# Patient Record
Sex: FEMALE | Race: WHITE | Employment: UNEMPLOYED | URBAN - METROPOLITAN AREA
[De-identification: names, ages, dates, MRNs, and addresses within clinical notes are randomized per-mention and may not be internally consistent; named-entity substitution may affect disease eponyms.]

---

## 2020-06-30 ENCOUNTER — TELEPHONE (OUTPATIENT)
Dept: ORTHOPEDIC SURGERY | Age: 40
End: 2020-06-30

## 2020-07-02 ENCOUNTER — OFFICE VISIT (OUTPATIENT)
Dept: ORTHOPEDIC SURGERY | Age: 40
End: 2020-07-02
Payer: COMMERCIAL

## 2020-07-02 VITALS — BODY MASS INDEX: 25.33 KG/M2 | WEIGHT: 187 LBS | HEIGHT: 72 IN

## 2020-07-02 PROBLEM — S43.431A TYPE 2 SUPERIOR LABRUM EXTENDING FROM ANTERIOR TO POSTERIOR (SLAP) LESION OF RIGHT SHOULDER: Status: ACTIVE | Noted: 2020-07-02

## 2020-07-02 PROBLEM — M75.111 NONTRAUMATIC INCOMPLETE TEAR OF RIGHT ROTATOR CUFF: Status: ACTIVE | Noted: 2020-07-02

## 2020-07-02 PROCEDURE — 99203 OFFICE O/P NEW LOW 30 MIN: CPT | Performed by: ORTHOPAEDIC SURGERY

## 2020-07-02 NOTE — PROGRESS NOTES
I am evaluating this patient as a consult at the request of Elisa Faustin MD; Dr. Shon Morocho DC        Chief Complaint:  Shoulder Pain (Right shoulder pain started a year ago)      History of Present Illness:  Shaan Esparza is a  37 y.o. right hand dominant female here regarding 1 year of right shoulder pain. Patient was initially evaluated by her PCP who thought we she was developing a frozen shoulder, patient is an insulin-dependent type 1 diabetic, she does have an insulin pump. Patient did not see improvement and returned back to her PCP who provided NSAIDs for bursitis, she did not see improvement again. She then presented to Dr. Asa Aviles chiropractor for acupuncture, he has been performing acupuncture, using the TENS unit, doing ultrasound through physical therapy exercises with her but again she was not showing significant improvement. She ultimately obtained an MRI and is here today to discuss those results. She complains of 3 out of 10 anterior pain, certain activities do not cause her any discomfort such as activities of daily living and fixing her hair. 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         Pain Assessment:  Pain Assessment  Location of Pain: Shoulder  Location Modifiers: Right  Severity of Pain: 3  Quality of Pain: Aching  Duration of Pain: Persistent  Frequency of Pain: Constant  Limiting Behavior: Yes  Relieving Factors: Rest  Result of Injury: No  Work-Related Injury: No  Are there other pain locations you wish to document?: No    Medical History:  Past Medical History:   Diagnosis Date    Diabetes mellitus ([facility] [address]     DKA (diabetic ketoacidoses) (HCC)     Mitral valve prolapse      Past Surgical History:   Procedure Laterality Date     SECTION      TONSILLECTOMY      TUBAL LIGATION       Social History     Socioeconomic History    Marital status:      Spouse name: None    Number of children: None    Years of education: None    Highest education level: None   Occupational History    None   Social Needs    Financial resource strain: None    Food insecurity     Worry: None     Inability: None    Transportation needs     Medical: None     Non-medical: None   Tobacco Use    Smoking status: Current Every Day Smoker     Packs/day: 1.00     Years: 18.00     Pack years: 18.00     Types: Cigarettes    Smokeless tobacco: Never Used   Substance and Sexual Activity    Alcohol use: No    Drug use: No    Sexual activity: None   Lifestyle    Physical activity     Days per week: None     Minutes per session: None    Stress: None   Relationships    Social connections     Talks on phone: None     Gets together: None     Attends Restorationism service: None     Active member of club or organization: None     Attends meetings of clubs or organizations: None     Relationship status: None    Intimate partner violence     Fear of current or ex partner: None     Emotionally abused: None     Physically abused: None     Forced sexual activity: None   Other Topics Concern    None   Social History Narrative    None     Allergies   Allergen Reactions    Acetaminophen     Aleve [Naproxen Sodium]     Sulfa Antibiotics     Ciprofloxacin-Ciproflox Hcl Er      Redness to injection site    Penicillins Rash       Review of Systems:  Constitutional: negative  Respiratory: negative  Cardiovascular: negative  Musculoskeletal:negative except for Shoulder Pain (Right shoulder pain started a year ago)    Relevant review of systems reviewed and available in the patient's chart in media tab    Vital Signs:  Vitals:    07/02/20 1513   Weight: 187 lb (84.8 kg)   Height: 6' 1\" (1.854 m)         General Exam:   Constitutional: Patient is adequately groomed with no evidence of malnutrition  Vascular: Examination reveals no swelling or calf tenderness. Peripheral pulses are palpable and 2+. Neurological: The patient has good coordination. There is no weakness or sensory deficit. PHYSICAL EXAMINATION: Inspection of the right shoulder reveals warm, dry, intact skin. There is no adenopathy. The distal neurovascular exam is grossly intact. Range of motion is 180 degrees of active forward flexion, functional external rotation to C2, internal rotation L1. Tenderness to palpation in the bicipital groove and medial border of the scapula. Tenderness also the greater tuberosity. Negative Westminster's. 4+ out of 5 strength and discomfort with empty can test, 5 out of 5 strength no discomfort with infraspinatus and subscapularis testing. Positive Dubois. Provocative SLAP, biceps tension, apprehension, AC joint tests are negative. Strength is graded 5/5 in all other muscle groups. Examination of the contralateral shoulder reveals no atrophy or deformity. The skin is warm and dry. Range of motion is within normal limits. There is no focal tenderness with palpation. Provocative SLAP, biceps tension, apprehension AC joint or rotator cuff tests are negative. Strength is graded 5/5 in all muscle groups. The distal neurovascular exam is grossly intact. Cervical spine: The skin is warm and dry. There is no swelling, warmth, or erythema. Range of motion is within normal limits. There is no paraspinal or spinous process tenderness. Spurling's sign is negative and did not produce shoulder pain. The distal neurovascular exam is grossly intact. Additional Comments: Sensation is intact to light touch throughout the median, ulnar and radial nerve distribution. Able to wiggle fingers, give thumbs up, A-OK and cross index and middle fingers. X-RAYS:  Four views of the right shoulder are obtained and reviewed. There is no periosteal reaction, medullary lesions, or osteopenia. Glenohumeral space is well maintained, the acromioclavicular joint space is well-maintained, acromiohumeral space is well-maintained and type II acromion. The lung fields are clear. MRI of the right shoulder was personally reviewed and shows:  Chronic type II SLAP tear limited to superior labrum  Posterior labrum shows superficial fraying  Chondromalacia of the posterior glenoid with grade 3-4 erosions measuring 2 mm in width and 6 mm in length, diffuse grade II chondromalacia at the glenohumeral joint  Synovitis within the glenohumeral joint is identified  Partial-thickness intermediate to high-grade tear at the supraspinatus  Distended and is normal appearing and stable within the groove. Assessment: Right shoulder chronic superior labral tear, partial-thickness rotator cuff tear, chondromalacia    Impression:  Encounter Diagnoses   Name Primary?  Right shoulder pain, unspecified chronicity     Type 2 superior labrum extending from anterior to posterior (SLAP) lesion of right shoulder, initial encounter Yes    Nontraumatic incomplete tear of right rotator cuff        Office Procedures:  Orders Placed This Encounter   Procedures    XR SHOULDER RIGHT (MIN 2 VIEWS)     Standing Status:   Future     Number of Occurrences:   1     Standing Expiration Date:   6/30/2021   [facility] ([facility] Physical Therapy     Referral Priority:   Routine     Referral Type:   Eval and Treat     Referral Reason:   Specialty Services Required     Requested Specialty:   Physical Therapy     Number of Visits Requested:   1     No orders of the defined types were placed in this encounter. Treatment Plan: We reviewed x-ray, MRI and physical exam findings as well as treatment options both conservative and surgical.  Findings of the shoulder are consistent with repetitive overhead use and likely attributed to playing volleyball and basketball growing up and into adulthood.   Conservative treatment options including anti-inflammatories, physical therapy, shoulder stabilization and activity modification. Surgical intervention would include arthroscopy with biceps tenodesis, chondroplasty, evaluation of rotator cuff with possible rotation medical graft, possible rotator cuff repair. Patient is an insulin-dependent type 1 diabetic, she does have an insulin pump, she states she has just recently gotten her diabetes under better control. She is quite fearful to undergo surgery at this time and would like to exhaust as much conservative treatment measures as she can for proceeding with surgery. She will begin physical therapy at our [facility] office. Unfortunately she did not do cortisone injections due to diabetes. I will see her back over the next 2 to 3 months for reevaluation. Patient agrees with this plan, all of their questions were answered best of our ability and to their satisfaction.       Derek Guadalupe

## 2020-07-30 ENCOUNTER — HOSPITAL ENCOUNTER (OUTPATIENT)
Dept: PHYSICAL THERAPY | Age: 40
Setting detail: THERAPIES SERIES
Discharge: HOME | End: 2020-07-30
Payer: COMMERCIAL

## 2020-07-30 PROCEDURE — 97140 MANUAL THERAPY 1/> REGIONS: CPT

## 2020-07-30 PROCEDURE — 97110 THERAPEUTIC EXERCISES: CPT

## 2020-07-30 PROCEDURE — 97161 PT EVAL LOW COMPLEX 20 MIN: CPT

## 2020-07-30 NOTE — FLOWSHEET NOTE
[facility], [address] [place] [address] [address], [id number]    Physical Therapy Treatment Note/ Progress Report:     Date:  2020    Patient Name:  Lester Aldana    :  1980  MRN: [id number]  Restrictions/Precautions:    Medical/Treatment Diagnosis Information:  · Diagnosis: R Shoulder Pain, Type 2 SLAP Tear, R RTC Tear  · Treatment Diagnosis: M25.511; Q02.072K, O28.945  Insurance/Certification information:  PT Insurance Information: [facility]  Physician Information:  Referring Practitioner: Jessica Weinberg  Has the plan of care been signed (Y/N):        []  Yes  [x]  No     Date of Patient follow up with Physician:     Is this a Progress Report:     []  Yes  [x]  No      If Yes:  Date Range for reporting period:  Initial Eval: 2020  Beginnin2020 --- Endin2020    Progress report will be due (10 Rx or 30 days whichever is less):      Recertification will be due (POC Duration  / 90 days whichever is less): 10/28/2020      Visit # Insurance Allowable Auth Required   In Person 12 (11 prior used) 39 (11 used) []  Yes     []  No    Tele Health -  []  Yes     []  No    Total 1 here         Functional Scale: Quick Dash: 18% (Total Number Sum:)   Date assessed: 2020     Latex Allergy:  [x]NO      []YES  Preferred Language for Healthcare:   [x]English       []other:    Pain level: 310     SUBJECTIVE:  See eval    OBJECTIVE: See eval   Observation:    Test measurements:      RESTRICTIONS/PRECAUTIONS: Type 2 SLAP Tear RTC Tear    Exercises/Interventions:   Therapeutic Ex (13038)  Therapeutic Activity (48829)  NMR re-education (41833) Sets/Reps Notes/CUES   [place]          Supine Flex  Cane Stretch 10 x 10\"    Supine ER Cane Stretch 10 x 10\"    Cane Press 20 x          SL Shoulder ER 20 x     SL Shoulder Flex 20 x     SL Shoulder Abduction 20 x          IR/ER Tband NV  HEP   Rows/Ext NV HEP        IR Towel Stretch 10 x10\" Manual Intervention (36033)     Jt Mob 5'    PROM 10'                                            Patient Education 5' Pt ed with HEP and progression of PT tx        Therapeutic Exercise and NMR EXR  [x] (38395) Provided verbal/tactile cueing for activities related to strengthening, flexibility, endurance, ROM  for improvements in scapular, scapulothoracic and UE control with self care, reaching, carrying, lifting, house/yardwork, driving/computer work. [x] (42367) Provided verbal/tactile cueing for activities related to improving balance, coordination, kinesthetic sense, posture, motor skill, proprioception  to assist with  scapular, scapulothoracic and UE control with self care, reaching, carrying, lifting, house/yardwork, driving/computer work. Therapeutic Activities:    [x] (15192 or 52984) Provided verbal/tactile cueing for activities related to improving balance, coordination, kinesthetic sense, posture, motor skill, proprioception and motor activation to allow for proper function of scapular, scapulothoracic and UE control with self care, carrying, lifting, driving/computer work.      Home Exercise Program:    [x] (83637) Reviewed/Progressed HEP activities related to strengthening, flexibility, endurance, ROM of scapular, scapulothoracic and UE control with self care, reaching, carrying, lifting, house/yardwork, driving/computer work  [x] (22130) Reviewed/Progressed HEP activities related to improving balance, coordination, kinesthetic sense, posture, motor skill, proprioception of scapular, scapulothoracic and UE control with self care, reaching, carrying, lifting, house/yardwork, driving/computer work      Manual Treatments:  PROM / STM / Oscillations-Mobs:  G-I, II, III, IV (PA's, Inf., Post.)  [x] (69653) Provided manual therapy to mobilize soft tissue/joints of cervical/CT, scapular GHJ and UE for the purpose of modulating pain, promoting relaxation,  increasing ROM, reducing/eliminating soft tissue swelling/inflammation/restriction, improving soft tissue extensibility and allowing for proper ROM for normal function with self care, reaching, carrying, lifting, house/yardwork, driving/computer work    Modalities:      Charges:  Timed Code Treatment Minutes: 25'   Total Treatment Minutes:  45'   [facility]:  [facility] TIME:  MANUAL TIME:  UNTIMED MINUTES:   -  -  -  -      [x] EVAL (LOW) 64584 (typically 20 minutes face-to-face)  [] EVAL (MOD) 94771 (typically 30 minutes face-to-face)  [] EVAL (HIGH) 58388 (typically 45 minutes face-to-face)  [] RE-EVAL     [x] YQ(46195) x   1  [] IONTO  [] NMR (04269) x     [] VASO  [x] Manual (80622) x 1   [] Other:  [] TA x      [] Mech Traction (19237)  [] ES(attended) (82225)      [] ES (un) (44759):    ASSESSMENT:  See eval    GOALS:   Short Term Goals: To be achieved in: 2 weeks  1. Independent in HEP and progression per patient tolerance, in order to prevent re-injury. [] Progressing: [] Met: [] Not Met: [] Adjusted   2. Patient will have a decrease in pain to facilitate improvement in movement, function, and ADLs as indicated by Functional Deficits. [] Progressing: [] Met: [] Not Met: [] Adjusted     Long Term Goals: To be achieved in: 12 weeks  1. Disability index score of 20% or less for the QuickDash/Oswestry to assist with reaching prior level of function. [] Progressing: [] Met: [] Not Met: [] Adjusted   2. Patient will demonstrate increased AROM to equal the opposite side bilaterally to allow for proper joint functioning as indicated by patients Functional Deficits. [] Progressing: [] Met: [] Not Met: [] Adjusted   3. Patient will demonstrate an increase in strength of R UE = L UE to allow for proper functional mobility as indicated by patients Functional Deficits. [] Progressing: [] Met: [] Not Met: [] Adjusted   4. Patient will return to all transfers, work activities, and functional activities without increased symptoms or restriction.    [] Progressing: [] Met: [] Not Met: [] Adjusted   5. Patient will have 0/10 pain with ADL's.  [] Progressing: [] Met: [] Not Met: [] Adjusted   6. Patient stated goal: To be able to be to use R UE for all functional activity   [] Progressing: [] Met: [] Not Met: [] Adjusted     Overall Progression Towards Functional goals/ Treatment Progress Update:  [] Patient is progressing as expected towards functional goals listed. [] Progression is slowed due to complexities/Impairments listed. [] Progression has been slowed due to co-morbidities. [x] Plan just implemented, too soon to assess goals progression <30days   [] Goals require adjustment due to lack of progress  [] Patient is not progressing as expected and requires additional follow up with physician  [] Other    Prognosis for POC: [x] Good [] Fair  [] Poor    Patient requires continued skilled intervention: [x] Yes  [] No    Treatment/Activity Tolerance:  [x] Patient able to complete treatment  [] Patient limited by fatigue  [] Patient limited by pain    [] Patient limited by other medical complications  [] Other:     Return to Play: (if applicable)   []  Stage 1: Intro to Strength   []  Stage 2: Return to Run and Strength   []  Stage 3: Return to Jump and Strength   []  Stage 4: Dynamic Strength and Agility   []  Stage 5: Sport Specific Training     []  Ready to Return to Play, Meets All Above Stages   []  Not Ready for Return to Sports   Comments:                         PLAN: See eval  [] Continue per plan of care [] Alter current plan (see comments above)  [x] Plan of care initiated [] Hold pending MD visit [] Discharge    Electronically signed by:  Ramya Emanuel PT    Note: If patient does not return for scheduled/ recommended follow up visits, this note will serve as a discharge from care along with most recent update on progress.

## 2020-07-30 NOTE — PLAN OF CARE
Radha [id number] [facility] [address] [facility], [address] ([facility]), [address] [facility]  Phone: [phone], [phone]                                                    Physical Therapy Certification    Dear Referring Practitioner: Swapna Ibarra,    We had the pleasure of evaluating the following patient for physical therapy services at [address] [place] [address]. A summary of our findings can be found in the initial assessment below. This includes our plan of care. If you have any questions or concerns regarding these findings, please do not hesitate to contact me at the office phone number checked above. Thank you for the referral.       Physician Signature:_______________________________Date:__________________  By signing above (or electronic signature), therapists plan is approved by physician      Patient: Lam Connors   : 1980   MRN: [id number]  Referring Physician: Referring Practitioner: Swapna Ibarra      Evaluation Date: 2020      Medical Diagnosis Information:  Diagnosis: R Shoulder Pain, Type 2 SLAP Tear, R RTC Tear   Treatment Diagnosis: M25.511; Z49.132R, M75.111                                         Insurance information: PT Insurance Information: BCBS    Precautions/ Contra-indications/Relevant Medical History: Insulin Pump    C-SSRS Triggered by Intake questionnaire (Past 2 wk assessment):   [x] No, Questionnaire did not trigger screening.   [] Yes, Patient intake triggered further evaluation      [] C-SSRS Screening completed  [] PCP notified via Plan of Care  [] Emergency services notified     Latex Allergy:  [x]NO      []YES     Preferred Language for Healthcare:   [x]English       []other:     SUBJECTIVE: Patient stated complaint: Patient is a 42 y/o female who presents with Right shoulder pain with onset about 1 year ago. Patient originally thought she was starting to get Frozen shoulder.  She reports having had Chiropractic Care including acupuncture, estim, and infrared tx x 11 visits and there was no improvement. Functional Disability Index: Quick Dash: 18% (Total Number Sum: 19/55)    Pain Scale: 3/10  Easing factors: Rest, Ice  Provocative factors: Overhead, use of arm, Lifting/Pushing/Pulling Away from the body     Type: [x]Constant   []Intermittent  []Radiating []Localized []other:      Numbness/Tingling: Tingling into the arm    Functional Limitations/Impairments: [x]Lifting/reaching []Grooming [x]Carrying    [x]ADL's []Driving [x]Sports/Recreations   []Other:    Occupation/School: [employer]     Living Status/Prior Level of Function: Independent with ADLs and IADLs    OBJECTIVE:     CERV ROM     Cervical Flexion WNL    Cervical Extension \"    Cervical SB \"    Cervical rotation \"         ROM Left Right   Shoulder Flex 175 °  146 °    Shoulder Abd 170 °  85 °    Shoulder ER T4 T4   Shoulder IR T5 T11   Elbow Flex     Elbow Ext     Wrist Flex     Wrist Ext     Strength  Left Right   Shoulder Flex 5/5 3/5   Shoulder Scap \" \"   Shoulder ER \" \"   Shoulder IR \" \"   Elbow Flex     Elbow Ext     Wrist Flex     Wrist Ext     Flavio        Reflexes/Sensation (myotomes/dermatomes):    [x]Normal    []Abnormal:      Joint mobility:    []Normal    [x]Hypo   []Hyper    Palpation:     Functional Mobility/Transfers: WNL    Posture: forward shoulders    Bandages/Dressings/Incisions: n/a    Gait (include devices/WB status): WNL    Orthopedic Special Tests: Empty Can (+), Lorane's (+) Codie Prince  [x] Patient history, allergies, meds reviewed. Medical chart reviewed. See intake form. Review Of Systems (ROS):  [x]Performed Review of systems (Integumentary, CardioPulmonary, Neurological) by intake and observation. Intake form has been scanned into medical record. Patient has been instructed to contact their primary care physician regarding ROS issues if not already being addressed at this time. Co-morbidities/Complexities (which will affect course of rehabilitation):   []None           Arthritic conditions   []Rheumatoid arthritis (M05.9)  []Osteoarthritis (M19.91)   Cardiovascular conditions   []Hypertension (I10)  []Hyperlipidemia (E78.5)  []Angina pectoris (I20)  []Atherosclerosis (I70)   Musculoskeletal conditions   []Disc pathology   []Congenital spine pathologies   []Prior surgical intervention  []Osteoporosis (M81.8)  []Osteopenia (M85.8)   Endocrine conditions   []Hypothyroid (E03.9)  []Hyperthyroid Gastrointestinal conditions   []Constipation (J01.15)   Metabolic conditions   []Morbid obesity (E66.01)  [x]Diabetes type 1(E10.65) or 2 (E11.65)   []Neuropathy (G60.9)     Pulmonary conditions   []Asthma (J45)  []Coughing   []COPD (J44.9)   Psychological Disorders  [x]Anxiety (F41.9)  [x]Depression (F32.9)   []Other:   [x]Other: Mitral Valve Prolapse,           Barriers to/and or personal factors that will affect rehab potential:              []Age  []Sex              []Motivation/Lack of Motivation                        []Co-Morbidities              []Cognitive Function, education/learning barriers              []Environmental, home barriers              []profession/work barriers  []past PT/medical experience  []other:  Justification:     Falls Risk Assessment (30 days):   [x] Falls Risk assessed and no intervention required.   [] Falls Risk assessed and Patient requires intervention due to being higher risk   TUG score (>12s at risk):     [] Falls education provided, including       ASSESSMENT:   Functional Impairments   []Noted spinal or UE joint hypomobility   []Noted spinal or UE joint hypermobility   [x]Decreased UE functional ROM   [x]Decreased UE functional strength   [x]Abnormal reflexes/sensation/myotomal/dermatomal deficits   [x]Decreased RC/scapular/core strength and neuromuscular control   []other:      Functional Activity Limitations (from functional questionnaire and intake)   []Reduced ability to tolerate prolonged functional positions   []Reduced ability or difficulty with changes of positions or transfers between positions   [x]Reduced ability to maintain good posture and demonstrate good body mechanics with sitting,  bending, and lifting   [] Reduced ability or tolerance with driving and/or computer work   [x]Reduced ability to sleep   [x]Reduced ability to perform lifting, reaching, carrying tasks   [x]Reduced ability to tolerate impact through UE   [x]Reduced ability to reach behind back   [x]Reduced ability to  or hold objects   [x]Reduced ability to throw or toss an object   []other:    Participation Restrictions   []Reduced participation in self care activities   [x]Reduced participation in home management activities   [x]Reduced participation in work activities   [x]Reduced participation in social activities. [x]Reduced participation in sport/recreation activities. Classification:   []Signs/symptoms consistent with post-surgical status including decreased ROM, strength and  function. []Signs/symptoms consistent with joint sprain/strain   []Signs/symptoms consistent with shoulder impingement   []Signs/symptoms consistent with shoulder/elbow/wrist tendinopathy   [x]Signs/symptoms consistent with Rotator cuff tear   [x]Signs/symptoms consistent with labral tear   []Signs/symptoms consistent with postural dysfunction    []Signs/symptoms consistent with Glenohumeral IR Deficit - <45 degrees   []Signs/symptoms consistent with facet dysfunction of cervical/thoracic spine    []Signs/symptoms consistent with pathology which may benefit from Dry needling     []other:      Tolerance of evaluation/treatment:    []Excellent   [x]Good    []Fair   []Poor    Physical Therapy Evaluation Complexity Justification  [x] A history of present problem with:  [] no personal factors and/or comorbidities that impact the plan of care;  []1-2 personal factors and/or comorbidities that impact the plan of care  [x]3 personal factors and/or comorbidities that impact the plan of care  [x] An examination of body systems using standardized tests and measures addressing any of the following: body structures and functions (impairments), activity limitations, and/or participation restrictions;:  [] a total of 1-2 or more elements   [] a total of 3 or more elements   [x] a total of 4 or more elements   [x] A clinical presentation with:  [x] stable and/or uncomplicated characteristics   [] evolving clinical presentation with changing characteristics  [] unstable and unpredictable characteristics;   [x] Clinical decision making of [x] low, [] moderate, [] high complexity using standardized patient assessment instrument and/or measurable assessment of functional outcome. [x] EVAL (LOW) 85513 (typically 20 minutes face-to-face)  [] EVAL (MOD) 23071 (typically 30 minutes face-to-face)  [] EVAL (HIGH) 63782 (typically 45 minutes face-to-face)  [] RE-EVAL     PLAN:  Frequency/Duration: 1-2 days per week for 12 weeks:  INTERVENTIONS:  [x]  Therapeutic exercise including: strength training, ROM, for upper extremity and core   [x]  NMR activation and proprioception for UE and Core   [x]  Manual therapy as indicated for UE and spine to include: Dry Needling/IASTM, STM, PROM, Gr I-IV mobilizations, manipulation. [x] Modalities as needed that may include: thermal agents, E-stim, Biofeedback, US, iontophoresis as indicated  [x] Patient education on joint protection, postural re-education, activity modification, progression of HEP. HEP instruction: (see scanned forms)    GOALS:    Short Term Goals: To be achieved in: 2 weeks  1. Independent in HEP and progression per patient tolerance, in order to prevent re-injury. [] Progressing: [] Met: [] Not Met: [] Adjusted   2. Patient will have a decrease in pain to facilitate improvement in movement, function, and ADLs as indicated by Functional Deficits.   [] Progressing: [] Met: [] Not Met: [] Adjusted     Long Term Goals: To be achieved in: 12 weeks  1. Disability index score of 20% or less for the QuickDash/Oswestry to assist with reaching prior level of function. [] Progressing: [] Met: [] Not Met: [] Adjusted   2. Patient will demonstrate increased AROM to equal the opposite side bilaterally to allow for proper joint functioning as indicated by patients Functional Deficits. [] Progressing: [] Met: [] Not Met: [] Adjusted   3. Patient will demonstrate an increase in strength of R UE = L UE to allow for proper functional mobility as indicated by patients Functional Deficits. [] Progressing: [] Met: [] Not Met: [] Adjusted   4. Patient will return to all transfers, work activities, and functional activities without increased symptoms or restriction. [] Progressing: [] Met: [] Not Met: [] Adjusted   5. Patient will have 0/10 pain with ADL's.  [] Progressing: [] Met: [] Not Met: [] Adjusted   6.  Patient stated goal: To be able to be to use R UE for all functional activity   [] Progressing: [] Met: [] Not Met: [] Adjusted      Electronically signed by:  Chelo Nam PT

## 2020-08-11 ENCOUNTER — HOSPITAL ENCOUNTER (OUTPATIENT)
Dept: PHYSICAL THERAPY | Age: 40
Setting detail: THERAPIES SERIES
Discharge: HOME | End: 2020-08-11
Payer: COMMERCIAL